# Patient Record
Sex: FEMALE | Race: WHITE | URBAN - METROPOLITAN AREA
[De-identification: names, ages, dates, MRNs, and addresses within clinical notes are randomized per-mention and may not be internally consistent; named-entity substitution may affect disease eponyms.]

---

## 2022-01-06 ENCOUNTER — OFFICE VISIT (OUTPATIENT)
Dept: OPHTHALMOLOGY | Facility: CLINIC | Age: 62
End: 2022-01-06
Payer: COMMERCIAL

## 2022-01-06 DIAGNOSIS — H43.391 FLOATERS, RIGHT: ICD-10-CM

## 2022-01-06 DIAGNOSIS — H25.13 AGE-RELATED NUCLEAR CATARACT OF BOTH EYES: Primary | ICD-10-CM

## 2022-01-06 PROCEDURE — 92004 COMPRE OPH EXAM NEW PT 1/>: CPT | Performed by: OPHTHALMOLOGY

## 2022-01-06 PROCEDURE — 92015 DETERMINE REFRACTIVE STATE: CPT | Performed by: OPHTHALMOLOGY

## 2022-01-06 NOTE — PROGRESS NOTES
Rosemary Felix is a 57year old female. HPI:     HPI     NP here for a complete eye exam. Pt's daughter has seen Dr Chas Holliday in the past. Pt's last eye exam about 3 years ago at [facility].  Pt complains of decrease in vision since her last eye exam. and quiet Deep and quiet    Iris Normal Normal    Lens 1+ Nuclear sclerosis 1+ Nuclear sclerosis    Vitreous Vitreous floaters Clear          Fundus Exam       Right Left    Disc Good rim, Temporal crescent Good rim, Temporal crescent    C/D Ratio 0.3 0.3

## 2022-01-06 NOTE — PATIENT INSTRUCTIONS
Age-related nuclear cataract of both eyes  Discussed early cataracts with patient. Told patient that cataracts are age appropriate and they are not surgical at this time. No treatment recommended at this time.      Floaters, right   There is no evidence of

## 2022-05-12 ENCOUNTER — TELEPHONE (OUTPATIENT)
Dept: OPHTHALMOLOGY | Facility: CLINIC | Age: 62
End: 2022-05-12